# Patient Record
Sex: MALE | Race: WHITE | Employment: FULL TIME | URBAN - METROPOLITAN AREA
[De-identification: names, ages, dates, MRNs, and addresses within clinical notes are randomized per-mention and may not be internally consistent; named-entity substitution may affect disease eponyms.]

---

## 2018-11-27 ENCOUNTER — HOSPITAL ENCOUNTER (OUTPATIENT)
Age: 28
Setting detail: SPECIMEN
Discharge: HOME | End: 2018-11-27
Payer: COMMERCIAL

## 2018-11-27 ENCOUNTER — OFFICE VISIT (OUTPATIENT)
Dept: FAMILY MEDICINE CLINIC | Age: 28
End: 2018-11-27
Payer: COMMERCIAL

## 2018-11-27 VITALS
HEIGHT: 73 IN | RESPIRATION RATE: 18 BRPM | SYSTOLIC BLOOD PRESSURE: 138 MMHG | OXYGEN SATURATION: 97 % | DIASTOLIC BLOOD PRESSURE: 82 MMHG | HEART RATE: 69 BPM | BODY MASS INDEX: 18.82 KG/M2 | WEIGHT: 142 LBS | TEMPERATURE: 97.9 F

## 2018-11-27 DIAGNOSIS — Z20.2 EXPOSURE TO TRICHOMONAS: Primary | ICD-10-CM

## 2018-11-27 DIAGNOSIS — Z20.2 EXPOSURE TO STD: ICD-10-CM

## 2018-11-27 PROCEDURE — 99202 OFFICE O/P NEW SF 15 MIN: CPT | Performed by: NURSE PRACTITIONER

## 2018-11-27 RX ORDER — METRONIDAZOLE 500 MG/1
2000 TABLET ORAL ONCE
Qty: 4 TABLET | Refills: 0 | Status: SHIPPED | OUTPATIENT
Start: 2018-11-27 | End: 2018-11-27

## 2018-11-27 ASSESSMENT — ENCOUNTER SYMPTOMS
WHEEZING: 0
RESPIRATORY NEGATIVE: 1
SHORTNESS OF BREATH: 0
COUGH: 0
CHEST TIGHTNESS: 0

## 2018-11-27 NOTE — PATIENT INSTRUCTIONS
or mouth. · Having one sex partner (who does not have STIs and does not have sex with anyone else) is a good way to avoid STIs. When should you call for help? Call your doctor now or seek immediate medical care if:    · You have new pain in your belly or pelvis.     · You have symptoms of a urinary tract infection. These may include:  ? Pain or burning when you urinate. ? A frequent need to urinate without being able to pass much urine. ? Pain in the flank, which is just below the rib cage and above the waist on either side of the back. ? Blood in your urine. ? A fever.     · You have new or worsening pain or swelling in the scrotum.    Watch closely for changes in your health, and be sure to contact your doctor if:    · You have unusual vaginal bleeding.     · You have a discharge from the vagina or penis.     · You have any new symptoms, such as sores, bumps, rashes, blisters, or warts.     · You have itching, tingling, pain, or burning in the genital or anal area.     · You think you may have an STI. Where can you learn more? Go to [email or web address]://[email or web address].[email or web address]. org and sign in to your [employer] account. Enter J811 in the [employer] box to learn more about \"Exposure to Sexually Transmitted Infections: Care Instructions. \"     If you do not have an account, please click on the \"Sign Up Now\" link. Current as of: November 27, 2017  Content Version: 11.8  © 6263-2087 Healthwise, [facility]. Care instructions adapted under license by [facility] ([facility]). If you have questions about a medical condition or this instruction, always ask your healthcare professional. [place] [address] any warranty or liability for your use of this information.

## 2018-11-29 LAB
C. TRACHOMATIS DNA ,URINE: NEGATIVE
N. GONORRHOEAE DNA, URINE: NEGATIVE

## 2022-05-26 ENCOUNTER — OFFICE VISIT (OUTPATIENT)
Dept: ORTHOPEDIC SURGERY | Age: 32
End: 2022-05-26
Payer: COMMERCIAL

## 2022-05-26 VITALS
SYSTOLIC BLOOD PRESSURE: 157 MMHG | HEIGHT: 73 IN | HEART RATE: 82 BPM | BODY MASS INDEX: 20.54 KG/M2 | DIASTOLIC BLOOD PRESSURE: 89 MMHG | WEIGHT: 155 LBS

## 2022-05-26 DIAGNOSIS — M65.4 DE QUERVAIN'S TENOSYNOVITIS, RIGHT: Primary | ICD-10-CM

## 2022-05-26 DIAGNOSIS — M25.531 RIGHT WRIST PAIN: Primary | ICD-10-CM

## 2022-05-26 PROCEDURE — 99203 OFFICE O/P NEW LOW 30 MIN: CPT | Performed by: FAMILY MEDICINE

## 2022-05-26 NOTE — PROGRESS NOTES
Sports Medicine Consultation    CHIEF COMPLAINT:  Wrist Pain (Right. 2+m. pain after playing disc golf. works at [employer])      HPI:  The patient is a 32 y.o. male who is being seen for  new patient being seen for regarding new problem of  r wrist pain. The patient is a right hand dominant male who has had right hand/wrist pain for 2 months. As far as trauma to the hand the patient indicates pain after disc golf. The following medications/interventions have been tried: activity avoidance, steroids with benefit. he has a past medical history of Stomach ulcer. he has a past surgical history that includes hernia repair. family history is not on file. Social History     Socioeconomic History    Marital status: Single     Spouse name: Not on file    Number of children: Not on file    Years of education: Not on file    Highest education level: Not on file   Occupational History    Not on file   Tobacco Use    Smoking status: Current Every Day Smoker     Types: Cigarettes    Smokeless tobacco: Never Used   Substance and Sexual Activity    Alcohol use: Yes    Drug use: Yes     Types: Marijuana Latisha Awkward)    Sexual activity: Yes     Partners: Female   Other Topics Concern    Not on file   Social History Narrative    Not on file     Social Determinants of Health     Financial Resource Strain:     Difficulty of Paying Living Expenses: Not on file   Food Insecurity:     Worried About Running Out of Food in the Last Year: Not on file    Manju of Food in the Last Year: Not on file   Transportation Needs:     Lack of Transportation (Medical): Not on file    Lack of Transportation (Non-Medical):  Not on file   Physical Activity:     Days of Exercise per Week: Not on file    Minutes of Exercise per Session: Not on file   Stress:     Feeling of Stress : Not on file   Social Connections:     Frequency of Communication with Friends and Family: Not on file    Frequency of Social Gatherings with Friends and Family: Not on file    Attends Rastafarian Services: Not on file    Active Member of Clubs or Organizations: Not on file    Attends Club or Organization Meetings: Not on file    Marital Status: Not on file   Intimate Partner Violence:     Fear of Current or Ex-Partner: Not on file    Emotionally Abused: Not on file    Physically Abused: Not on file    Sexually Abused: Not on file   Housing Stability:     Unable to Pay for Housing in the Last Year: Not on file    Number of [place] in the Last Year: Not on file    Unstable Housing in the Last Year: Not on file       Current Outpatient Medications   Medication Sig Dispense Refill    diclofenac sodium (VOLTAREN) 1 % GEL Apply 2 g topically 4 times daily 100 g 5     No current facility-administered medications for this visit. Allergies:  hehas No Known Allergies. ROS:  CV:  Denies chest pain; palpitations; shortness of breath; swelling of feet, ankles; and loss of consciousness. CON: Denies fever and dizziness. ENT:  Denies hearing loss / ringing, ear infections hoarseness, and swallowing problems. RESP:  Denies chronic cough, spitting up blood, and asthma/wheezing. GI: Denies abdominal pain, change in bowel habits, nausea or vomiting, and blood in stools. :  Denies frequent urination, burning or painful urination, blood in the urine, and bladder incontinence. NEURO:  Denies headache, memory loss, sleep disturbance, and tremor or movement disorder. PHYSICAL EXAM:    SKIN:  Intact without rashes, lesions or ulcerations. No obvious deformity or swelling. EYES:  Extraocular muscles intact. MOUTH: Oral mucosa moist.  No perioral lesions. PULM:  Respirations unlabored and regular. VASC:  Capillary refill less than 2 seconds.       Hand/Wrist Exam  ROM:  Full flexor and extensor tendon function intact   Finger, hand, and wrist range of motion are WNL  Inspection-Deformity: no  Palpation-Tenderness: first dorsal

## 2022-06-14 ENCOUNTER — OFFICE VISIT (OUTPATIENT)
Dept: ORTHOPEDIC SURGERY | Age: 32
End: 2022-06-14
Payer: COMMERCIAL

## 2022-06-14 VITALS
WEIGHT: 155 LBS | HEART RATE: 66 BPM | SYSTOLIC BLOOD PRESSURE: 126 MMHG | BODY MASS INDEX: 20.54 KG/M2 | RESPIRATION RATE: 12 BRPM | DIASTOLIC BLOOD PRESSURE: 71 MMHG | HEIGHT: 73 IN

## 2022-06-14 DIAGNOSIS — M65.4 DE QUERVAIN'S TENOSYNOVITIS, RIGHT: Primary | ICD-10-CM

## 2022-06-14 PROCEDURE — 20550 NJX 1 TENDON SHEATH/LIGAMENT: CPT | Performed by: PHYSICIAN ASSISTANT

## 2022-06-14 PROCEDURE — 99213 OFFICE O/P EST LOW 20 MIN: CPT | Performed by: PHYSICIAN ASSISTANT

## 2022-06-14 RX ORDER — LIDOCAINE HYDROCHLORIDE 10 MG/ML
2 INJECTION, SOLUTION INFILTRATION; PERINEURAL ONCE
Status: COMPLETED | OUTPATIENT
Start: 2022-06-14 | End: 2022-06-14

## 2022-06-14 RX ORDER — METHYLPREDNISOLONE ACETATE 80 MG/ML
80 INJECTION, SUSPENSION INTRA-ARTICULAR; INTRALESIONAL; INTRAMUSCULAR; SOFT TISSUE ONCE
Status: COMPLETED | OUTPATIENT
Start: 2022-06-14 | End: 2022-06-14

## 2022-06-14 RX ADMIN — METHYLPREDNISOLONE ACETATE 80 MG: 80 INJECTION, SUSPENSION INTRA-ARTICULAR; INTRALESIONAL; INTRAMUSCULAR; SOFT TISSUE at 16:42

## 2022-06-14 RX ADMIN — LIDOCAINE HYDROCHLORIDE 2 ML: 10 INJECTION, SOLUTION INFILTRATION; PERINEURAL at 16:41

## 2022-06-14 ASSESSMENT — ENCOUNTER SYMPTOMS
DIARRHEA: 0
ABDOMINAL PAIN: 0
NAUSEA: 0
VOMITING: 0
CHEST TIGHTNESS: 0
ABDOMINAL DISTENTION: 0
COLOR CHANGE: 0
RESPIRATORY NEGATIVE: 1
COUGH: 0
CONSTIPATION: 0
SHORTNESS OF BREATH: 0
APNEA: 0

## 2022-06-14 NOTE — PROGRESS NOTES
[address]  AND SPORTS MEDICINE  [address] [place] [address]  Dept: [phone]  Dept Fax: [phone]        Right Hand & Wrist   New Patient    Subjective:     Chief Complaint   Patient presents with    Wrist Pain     R Wrist Pain     HPI:     Gokul Kelsey presents with a 2.5 month history of in the right wrist.  He saw Dr. Mansi Ayers on 5/26/2022 and was diagnosed with de Quervain's tenosynovitis. Patient is right-hand dominant. He has pain with disc golf and working in [employer]. He has tried activity avoidance and steroids with benefit. ROS:     Review of Systems   Constitutional: Positive for activity change. Negative for appetite change, fatigue and fever. Respiratory: Negative. Negative for apnea, cough, chest tightness and shortness of breath. Cardiovascular: Negative. Negative for chest pain, palpitations and leg swelling. Gastrointestinal: Negative for abdominal distention, abdominal pain, constipation, diarrhea, nausea and vomiting. Genitourinary: Negative for difficulty urinating, dysuria and hematuria. Musculoskeletal: Positive for arthralgias. Negative for gait problem, joint swelling and myalgias. Skin: Negative for color change and rash. Neurological: Negative for dizziness, weakness, numbness and headaches. Psychiatric/Behavioral: Positive for sleep disturbance.        Past Medical History:    Past Medical History:   Diagnosis Date    Stomach ulcer        Past Surgical History:    Past Surgical History:   Procedure Laterality Date    HERNIA REPAIR         CurrentMedications:   Current Outpatient Medications   Medication Sig Dispense Refill    diclofenac sodium (VOLTAREN) 1 % GEL Apply 2 g topically 4 times daily 100 g 5     Current Facility-Administered Medications   Medication Dose Route Frequency Provider Last Rate Last Admin    methylPREDNISolone acetate (DEPO-MEDROL) injection 80 mg  80 mg Intra-artICUlar Once Alma Delia Austin PA-C        lidocaine 1 % injection 2 mL  2 mL Intra-artICUlar Once Alma Delia Austin PA-C           Allergies:    Patient has no known allergies. Social History:   Social History     Socioeconomic History    Marital status: Single     Spouse name: None    Number of children: None    Years of education: None    Highest education level: None   Occupational History    None   Tobacco Use    Smoking status: Current Every Day Smoker     Types: Cigarettes    Smokeless tobacco: Never Used   Substance and Sexual Activity    Alcohol use: Yes    Drug use: Yes     Types: Marijuana Veldon Breath)    Sexual activity: Yes     Partners: Female   Other Topics Concern    None   Social History Narrative    None     Social Determinants of Health     Financial Resource Strain:     Difficulty of Paying Living Expenses: Not on file   Food Insecurity:     Worried About Running Out of Food in the Last Year: Not on file    Manju of Food in the Last Year: Not on file   Transportation Needs:     Lack of Transportation (Medical): Not on file    Lack of Transportation (Non-Medical):  Not on file   Physical Activity:     Days of Exercise per Week: Not on file    Minutes of Exercise per Session: Not on file   Stress:     Feeling of Stress : Not on file   Social Connections:     Frequency of Communication with Friends and Family: Not on file    Frequency of Social Gatherings with Friends and Family: Not on file    Attends Church Services: Not on file    Active Member of Clubs or Organizations: Not on file    Attends Club or Organization Meetings: Not on file    Marital Status: Not on file   Intimate Partner Violence:     Fear of Current or Ex-Partner: Not on file    Emotionally Abused: Not on file    Physically Abused: Not on file    Sexually Abused: Not on file   Housing Stability:     Unable to Pay for Housing in the Last Year: Not on file    Number of Places Lived in the Last Year: Not on file    Unstable Housing in the Last Year: Not on file       Family History:  No family history on file. Vitals:   /71   Pulse 66   Resp 12   Ht 6' 1\" (1.854 m)   Wt 155 lb (70.3 kg)   BMI 20.45 kg/m²  Body mass index is 20.45 kg/m². Physical Examination:     Orthopedics    GENERAL: Alert and oriented X3 in no acute distress. SKIN: Visual inspection reveals no soft tissue swelling or colby abnormality, no rotational deformity, Intact without lesions or ulcerations. NEURO: Radial, Ulnar and Median nerves are intact to sensory and motor testing. VASC: Capillary refill is less than 3 seconds, no signs of thoracic outlet syndrome, negative Boris's test.    Hand & Wrist Exam    LOCATION: Right Hand & Wrist  MUSC: Good strength is available in these motions. WRIST: No pain to palpation. No colby pain or instability to palpation. WRIST TESTS: Negative finkelstein's, Negative Tinel's test, Negative Phalen's, Negative West Compression  ROM: Full flexor and extensor tendon function is intact, 30 degrees of Flexion, 45 degrees of Extension, 90 degrees of Pronation, 90 degrees of Supination  PALPATION: pain over the 1st dorsal compartment  Assessment:     1. De Quervain's tenosynovitis, right      Procedures:    Procedure: yes      Procedure: The patient agreed to have a corticosteroid injection into the first dorsal compartment of the wrist. With the patient lying on the exam table, the point of maximum tenderness was identified just proximal to the 1st dorsal compartment and was marked with the hollow point of a click type ball-point pen. the skin was prepped with betadine in a sterile fashion. Utilizing ultrasound for precise placement, utilizing clean technique with sterile gloves, a 3cc solution containing 2 ml of 1% lidocaine   and 1 ml containing 80mg of Depo Medrol was injected. There was visual confirmation of fluid swelling into the compartment.  Thumb motion produced crepitation verifying injection within the sheath and not tendon. the wound was cleansed and a band aid was placed. The patient tolerated the procedure without difficulty. Adverse reactions of the injection were discussed with the patient including signs of infection (increasing pain, redness, swelling) and the patient was instructed to call immediately with any of these symptoms. Radiology:   No results found. Plan:   Treatment : I reviewed the X-ray with the patient and I informed them that the x-ray does not see any bony abnormality. We discussed the etiologies and natural histories of de Quervain's tenosynovitis. We discussed the various treatment alternatives including anti-inflammatory medications, physical therapy, injections, further imaging studies and as a last result surgery. During today's visit, we discussed that he is in significant pain today. We can try a de Quervain's injection which should help decrease the pain and hopefully have it go away. I would then also like him to go to occupational therapy. If he is not getting better I will order an MRI of his right wrist for possible surgical planning. He can continue wearing the brace. He states the brace is bothering him because the thumb is too hyperextended. I told him he can bend the brace to bring the thumb closer. The patient has opted for a cortisone injection into the first dorsal compartment of the right wrist to help reduce inflammation and pain. The injection site should never get red, hot, or swollen and if it does the patient will contact our office right away. The patient may experience a increase in soreness the first 24-48 hours due to a cortisone flair and can take anti-inflammatories for a short period of time to reduce that soreness. The patient should not submerge the injection site in water for a minimum of 24 hours to avoid infection. This means no lakes, pools, ponds, or hot tubs for 24 hours.  If the patient is diabetic the injection may increase their blood sugar for up to one week. The patient can do this cortisone injection once every 3 months as needed. If the injections stop working and do not give the patient relief the patient should consider surgical interventions to produce long term relief. An Occupational Therapy prescription was given to the patient. He should follow-up in the office in approximately 6 weeks with myself or Dr. Ana Mendez. The patient will call the office immediately with any problems. Orders Placed This Encounter   Medications    methylPREDNISolone acetate (DEPO-MEDROL) injection 80 mg    lidocaine 1 % injection 2 mL       Orders Placed This Encounter   Procedures   [address] Occupational Therapy [facility]     Referral Priority:   Routine     Referral Type:   Eval and Treat     Referral Reason:   Specialty Services Required     Requested Specialty:   Occupational Therapy     Number of Visits Requested:   1       This note is created with the assistance of a speech recognition program.  While intending to generate a document that actually reflects the content of the visit, the document can still have some errors including those of syntax and sound a like substitutions which may escape proof reading.   In such instances, actual meaning can be extrapolated by contextual diversion      Electronically signed by Yolanda Pacheco PA-C, on 6/14/2022 at 3:52 PM

## 2022-06-22 ENCOUNTER — HOSPITAL ENCOUNTER (OUTPATIENT)
Dept: OCCUPATIONAL THERAPY | Facility: CLINIC | Age: 32
Setting detail: THERAPIES SERIES
Discharge: HOME | End: 2022-06-22
Payer: COMMERCIAL

## 2022-06-22 PROCEDURE — 97165 OT EVAL LOW COMPLEX 30 MIN: CPT

## 2022-06-22 PROCEDURE — 97530 THERAPEUTIC ACTIVITIES: CPT

## 2022-06-22 NOTE — CONSULTS
[] [facility] Outpatient       Occupational Therapy 1st floor       [address], [place]         Phone: [phone]       Fax: [phone] [x] [place]  Occupational Therapy  [address], [place]  Phone: [phone]  Fax: [phone]       Occupational Therapy Hand & Upper Extremity  Initial Evaluation    Date: 2022      Patient: Des Galarza  : 1990  MRN: [id number]  Referring Provider:  Gunjan Herron PA-C  Insurance: Other - BCBS, 60 visit limit   Medical Diagnosis: De quervain's tenosynovitis, right M65.4   Rehab Codes: muscle weakness generalized M62.81,, pain in right forearm M79.631,, pain in right hand M79.641, or pain in right finger(s) M79.644,  Onset Date: 22    Next Dr. Marisela Powell: TBD    Subjective:   CC: having to change day to day activities   HPI: (onset date) Pt presents this date s/p thumb injury that occurred from playing disc golf. Pt reports symptoms were progressively getting worse for the past month and a half, but he received a cortisone shot last week and symptoms have now begun to improve. Before he received the injection, pain was 10/10. Pt report he occasionally takes NSAIDs for pain relief which do provide some relief. Pt reports he has been feeling pretty good following his shifts at work (~10 hours) because he is \"babying\" it a little bit. Pt reports he is safely able to complete work activities with decreased use of R hand. X-ray was negative for a fx. Pt reports he is no longer using heat or ice for pain relief. Mechanism of Injury: playing disc golf   Surgery Date: NA  Precautions: Other activity as tolerated     Involved Extremity: Right   Dominant Extremity: Right    Past Medical History: Unremarkable          Comorbidities: NA    Medications: None Allergies:  None    Pain: Intensity:   5/10 with activity, 0/10 at rest   Location: R wrist/thumb      Pain Type:  Intermittant and with movement/activity evaluations, the scores must differ by at least 9 points.)    Sensibility: Normal Edema: None      Skin Color: Normal Skin/Scar condition Intact     STRENGTH       Right   (pounds) Left   (pounds)    83 100   Lateral pinch 22 24   2 point pinch 21 18   3 jaw pinch 21 19     Bilateral  strength is normally symmetrical or up to 10% stronger on the dominant extremity, depending on the individual's physically activity level. The affected extremity is 77% weaker than the unaffected extremity (affected score/unaffected score, take the total and subtract from 100)    COORDINATION  - Fine Motor (speed/dexterity)     Right in seconds Percentile Left in seconds Percentile   9 Hole Peg Test 21.38  20.47      WRIST/FOREARM      Right ROM (degrees)   Extension (60-70) 55   Flexion (70-80) 65 (painful)   Radial Deviation (20-25) 15   Ulnar Deviation (30-40) 20   Forearm Pronation (80-90) WFL   Forearm Supination (80-90) WFL   (0 is used for neutral, + is used for hyperextension, - is used for extensor lag per ASHT)    THUMB        RIGHT  (degrees) LEFT  (degrees)   CMC (ad/abduction) 40/80 (pain) Abduction 85 degrees    MCP   (ext/flex) 0/55    IP  (ext/flex) 0/85          Problems: Pain, ROM, Strength and Function     Assessment:  Patient would benefit from skilled occupational therapy services in order to: Improve  functional /grasp, ROM, Strength, Activity tolerance and Complaint of pain in order to improve functional use of UE in ADL performance    Short Term Goals: (  6-7    Treatments)  1. Decrease Pain: to 2/10 with daily activity participation   2. Increase AROM  a. R wrist flexion to 70 degrees with minimal pain reported to complete work activities with less difficulty  b. R thumb CMC abduction to 85 degrees with minimal pain reported to complete work activities with less difficulty   3.  Increase strength (pounds);  a. R  by 8 pounds to complete work activities with less difficulty   b. R lateral pinch by 2 pounds to complete work activities with less difficulty  4. Increase function:UE Functional Index Score to 58/80 or more points to promote increased functional abilities  5. Patient to be independent with home exercise program as demonstrated by performance with correct form without cues. Long Term Goals: (  12  Treatments)  1. Increase R  by 12+ pounds to complete ADL, IADL, and work activities at prior level  2. Improve functional skills AEB a score of 65/80 or more points with UEFI  3. Pt will report 75% improvement of completing work activities with minimal pain    Patient Goals: not having to compensate activities     Treatment Potential: Good Suggested Professional Referral: No  Domestic Concerns: No   Barriers to Goal Achievement: No    Comments/Assessment: Pt arrived late to evaluation, additional HEP not given due to time. Presents s/p an injury to R thumb that occurred from playing disc golf in March. Demo's a positive Finkelstein's with R hand. Pt is experiencing decreased ROM, strength, and function with R hand, more specifically R thumb. Pt continues to work normal duty at [employer], states there isn't any light duty available at his job. States he is completing work activities with modified use of R hand. States that after a work day, R hand will be very fatigued and sore. 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 Pain is pt's greatest factor prohibiting him from performing daily activities normally. Has received benefit cortisone injection that was given last week. Pt has a thumb spica that he wears at night.      Home Program Initiated: Verbal and Demo    Comprehension of Education: Yes and Needs Review       Plans, Goals, Risks, Benefits, Discussed with and Patient    Treatment Plan:  Therapeutic Ex 70161, Therapeutic Act 38239, HEP and Ultrasound [id number]       Treatment Plan:  Frequency: 2 x/week for 12 visits     Today's Treatment:  Modalities: NA this date  Precautions: NA    Exercise Flow Sheet:  Exercise Reps/Time Weight/Level Comments   Active CMC ab/adduction 10-15 reps  Added & completed; issued for HEP                                 Other: NA    Specific Instructions for Next Treatment: add additional ROM HEP    Evaluation Complexity:  History (Personal factors, comorbidities) [x]  0 []  1-2 []  3+   Exam (limitations, restrictions) [x]  1-2 []  3 []  4+   Decision Making [x]  Low []  Moderate []  High   ? [x]  Low Complexity []  Moderate   Complexity []  High Complexity      Treatment Charges: Mins Units   Evaluation  []  High  []  Moderate  [x]  Low        35        1   []  Ther Exercise     []  Manual Therapy     [x]  Ther Activities 10 1   []  Orthotic fit/train     []  Orthotic recheck     []       Total Treatment time 45 min      Time In: 3701   Time out: 0900     Electronically signed by MARISSA Alvarez on 6/22/2022 at 8:21 AM    Physician Signature: _________________________ Date: _______________  By signing above or cosigning this note, I have reviewed this plan of care and certify a need for medically necessary rehabilitation services.      *PLEASE SIGN ABOVE AND FAX BACK ALL PAGES*

## 2022-06-29 ENCOUNTER — HOSPITAL ENCOUNTER (OUTPATIENT)
Dept: OCCUPATIONAL THERAPY | Facility: CLINIC | Age: 32
Setting detail: THERAPIES SERIES
Discharge: HOME | End: 2022-06-29
Payer: COMMERCIAL

## 2022-06-29 PROCEDURE — 97110 THERAPEUTIC EXERCISES: CPT

## 2022-06-29 PROCEDURE — 97035 APP MDLTY 1+ULTRASOUND EA 15: CPT

## 2022-06-29 NOTE — FLOWSHEET NOTE
[] Morteza Moshe       Occupational Therapy            1st floor       [address] [facility], [address]         Phone: [phone]       Fax: [phone] [x] [facility]  [address] [place] [address] Cosmo Yu [facility], [address] [place] [address]  Phone: [phone]  Fax: [phone]      Occupational Therapy Daily Treatment Note    Date:  2022  Patient Name:  Tony Sanderson    :  1990  MRN: [id number]  Referring Provider:  Gunjan Toure PA-C  Insurance: Other - BCBS, 60 visit limit   Medical Diagnosis: De quervain's tenosynovitis, right M65.4           Rehab Codes: muscle weakness generalized M62.81,, pain in right forearm M79.631,, pain in right hand M79.641, or pain in right finger(s) M79.644,  Onset Date: 22                Visit# / total visits: 2/12    Cancels/No Shows: 0/0      Subjective:  Pt reports he feels his pain is \"much better \" today. Pt reports even at work it has gotten better. Reports he no longer wears splint at night although educated to continue if pain resumes. Pain:  [] Yes  [x] No Location: N/A Pain Rating: (0-10 scale) 0/10  Pain altered Tx:  [x] No  [] Yes  Action: N/A  Precautions: NA  Surgery procedure and date: NA    Objective: Today's Treatment:  Modalities: Ultrasound  Exercises: Flow Sheet:  EXERCISE    REPS/     TIME  WEIGHT/    LEVEL COMMENTS   Active CMC ab/adduction 10-15 reps  Completed   Wrist Roll  2 series . 5 lb Added and Completed   AROM exercises 10 reps  Issued and Completed   Wrist maze 2 series  Added and Completed   Pronation/Supination cup transfer 20 cups  Added and Completed    Resistive Peg board 1 series  Added and Completed    Palm to finger translations with marbles  1 series  Added and Completed    Resistive pin transfer with foam blocks 1 series  Added and Completed   Resistive Foam block 1 series Blue Issued and Completed   Other: Modality Flow Sheet:    START STOP Tx Modality     Electrical Stim:     22  Ultrasound: _.8_ W/cm2 x 8 mins  Duty factor: _X_100%  __50%  __33% __20%  Head size:    __2_ cm  MHz: __1mHz  _X_3mHz  Location: R thumb, radial side of hand     Hot Pack:     Cold Pack:           Assessment: [x] Progressing toward goals. Pt introduced to interventions targeting R hand dexterity and strength to promote functional use and reduce pain of R hand for work and daily occupations. Pt reports increased tightness during wrist flexion component of wrist roll. Reports slight muscle fatigue on radial side of arm and hand after completing palm to finger translations with marbles and pin block transfer interventions. Completed ultrasound on R thumb and radial side of hand to reduce inflammation and promote ROM for reduced pain. Issued additional HEP for wrist AROM with good participation and understanding, min cues on technique. Issued blue foam block for resistance training due to no c/o pain during any ROM intervention this date. Pt demonstrated good understanding of resistive HEP with foam block. [] No change. [] Other     [x] Patient would continue to benefit from skilled occupational therapy services in order to maximize IND with work, ADL's, IADL's to reduce pain of range of motion inducing pain. STG/LTG   Short Term Goals: (  6-7    Treatments)  1. Decrease Pain: to 2/10 with daily activity participation   2. Increase AROM  a. R wrist flexion to 70 degrees with minimal pain reported to complete work activities with less difficulty  b. R thumb CMC abduction to 85 degrees with minimal pain reported to complete work activities with less difficulty   3. Increase strength (pounds);  a. R  by 8 pounds to complete work activities with less difficulty   b. R lateral pinch by 2 pounds to complete work activities with less difficulty  4. Increase function:UE Functional Index Score to 58/80 or more points to promote increased functional abilities  5.  Patient to be independent with home exercise program as demonstrated by performance with correct form without cues.     Long Term Goals: (  12  Treatments)  1. Increase R  by 12+ pounds to complete ADL, IADL, and work activities at prior level  2. Improve functional skills AEB a score of 65/80 or more points with UEFI  3. Pt will report 75% improvement of completing work activities with minimal pain        Pt. Education:  [x] Yes  [] No  [x] Reviewed Prior HEP/Ed  Method of Education: [x] Verbal  [x] Demo  [x] Written  Re: Educated on HEP and purpose of completing ROM tasks. Educated on ultrasound and benefits. Educated to resume wearing wrist splint at night if pain occurs. Comprehension of Education:  [x] Verbalizes understanding. [x] Demonstrates understanding. [] Needs review. [x] Demonstrates/verbalizes HEP/Ed previously given. Plan: [x] Continue current frequency toward short and long term goals. [x] Specific Instructions for subsequent treatments: HEP review. Upgrades to resistance based exercise.     [] Other:       Time In: 1502  Time Out: 2103       Treatment Charges: Mins Units   []  Modalities:      [x]  Ultrasound 8 1   [x]  Ther Exercise 36 2   []  Manual Therapy     []  Ther Activities     []  Orthotic fit/train     []  Orthotic recheck     []  Other     Total Treatment time 44 3       Electronically signed by:  DESI Sage

## 2022-06-30 ENCOUNTER — APPOINTMENT (OUTPATIENT)
Dept: OCCUPATIONAL THERAPY | Facility: CLINIC | Age: 32
End: 2022-06-30
Payer: COMMERCIAL

## 2022-08-04 NOTE — DISCHARGE SUMMARY
[] [place] Moshe       Occupational Therapy             1st floor       [address] [place], [place]         Phone: [phone]       Fax: [phone] [x] [facility]  [address] [place], [place]  Phone: [phone]  Fax: [phone]         Occupational Therapy Discharge Note    Date: 2022      Patient: Rochelle Mendez  : 1990  MRN: [id number]    Referring Provider:  Gunjan Garzon PA-C  Insurance: Other - BCBS, 60 visit limit   Medical Diagnosis: De quervain's tenosynovitis, right M65.4           Rehab Codes: muscle weakness generalized M62.81,, pain in right forearm M79.631,, pain in right hand M79.641, or pain in right finger(s) M79.644,  Onset Date: 22                 Next  [facility]: TBD  Total visits attended:2  Cancels/No shows: 0/1  Date of initial visit: 22                Date of final visit: 22      Subjective:  Refer to initial evaluation. Objective:  Refer to initial evaluation. Assessment:  Refer to initial evaluation. Treatment to Date:     [x]  Therapeutic Exercise   18790              []  Iontophoresis: 4 mg/mL Dexamethasone Sodium Phosphate  mAmin  95509    [x]  Therapeutic Activity  45936  []  Vasopneumatic cold with compression  86824                []  ADL training  83126  [x]  Ultrasound        26073    []  Neuromuscular Re-education  72600  []  Electrical Stimulation Attended  92830    []  Manual Therapy  56838  Orthotic    []  Fit  51959     []  Train 32464    [x]  Instruction in HEP        Prosthetic    []  Fit 40424      []  Train [id number]    []  Cognitive Interventions, (first 15 min 85459, subsequent 15 min 70891)  []  Cold/hotpack      []  Massage   30660             Discharge Status:     [] Pt recovered from conditions. Treatment goals were met. [] Pt received maximum benefit. No further therapy indicated at this time.     [] Pt to continue exercise/home instructions independently. [] Therapy interrupted due to:    [] Pt has 2 or more no shows/cancels, is discontinued per our policy. [] Pt has completed prescribed number of treatment sessions. [x] Other: pt no show to last scheduled treatment. Never called clinic to reschedule. Electronically signed by: Baldemar Rahman OTR/L    If you have any questions or concerns, please don't hesitate to call.   Thank you for your referral.

## 2025-01-16 SDOH — HEALTH STABILITY: PHYSICAL HEALTH: ON AVERAGE, HOW MANY DAYS PER WEEK DO YOU ENGAGE IN MODERATE TO STRENUOUS EXERCISE (LIKE A BRISK WALK)?: 7 DAYS

## 2025-01-16 SDOH — HEALTH STABILITY: PHYSICAL HEALTH: ON AVERAGE, HOW MANY MINUTES DO YOU ENGAGE IN EXERCISE AT THIS LEVEL?: 60 MIN

## 2025-01-16 NOTE — PATIENT INSTRUCTIONS
PATIENTIQ:  PatientIQ helps [facility] stay in touch with you to know how you're feeling, and provides education and care instructions to you at various time points.   Your answers help your care team track your progress to provide the best care possible. PatientIQ will contact you pre-op and post-op via email or text with:  Educational Videos and Care Instructions  Questionnaires About How You're Feeling    Your participation provides you valuable education and helps [facility] continue to provide quality care to all patients. Thank you

## 2025-01-17 ENCOUNTER — OFFICE VISIT (OUTPATIENT)
Dept: ORTHOPEDIC SURGERY | Age: 35
End: 2025-01-17
Payer: COMMERCIAL

## 2025-01-17 VITALS — HEIGHT: 73 IN | BODY MASS INDEX: 20.54 KG/M2 | WEIGHT: 155 LBS | OXYGEN SATURATION: 100 % | RESPIRATION RATE: 16 BRPM

## 2025-01-17 DIAGNOSIS — M65.4 DE QUERVAIN'S TENOSYNOVITIS, LEFT: Primary | ICD-10-CM

## 2025-01-17 DIAGNOSIS — M25.532 LEFT WRIST PAIN: Primary | ICD-10-CM

## 2025-01-17 PROCEDURE — 99213 OFFICE O/P EST LOW 20 MIN: CPT

## 2025-01-17 RX ORDER — DICLOFENAC SODIUM 75 MG/1
75 TABLET, DELAYED RELEASE ORAL 2 TIMES DAILY
Qty: 28 TABLET | Refills: 0 | Status: SHIPPED | OUTPATIENT
Start: 2025-01-17 | End: 2025-01-31

## 2025-01-17 ASSESSMENT — ENCOUNTER SYMPTOMS: COLOR CHANGE: 0

## 2025-02-04 ENCOUNTER — TELEPHONE (OUTPATIENT)
Dept: ORTHOPEDIC SURGERY | Age: 35
End: 2025-02-04

## 2025-02-04 ENCOUNTER — OFFICE VISIT (OUTPATIENT)
Dept: ORTHOPEDIC SURGERY | Age: 35
End: 2025-02-04

## 2025-02-04 VITALS — HEIGHT: 73 IN | BODY MASS INDEX: 20.15 KG/M2 | WEIGHT: 152 LBS | OXYGEN SATURATION: 97 % | RESPIRATION RATE: 18 BRPM

## 2025-02-04 DIAGNOSIS — M65.4 DE QUERVAIN'S TENOSYNOVITIS, LEFT: Primary | ICD-10-CM

## 2025-02-04 RX ORDER — LIDOCAINE HYDROCHLORIDE 10 MG/ML
0.5 INJECTION, SOLUTION INFILTRATION; PERINEURAL ONCE
Status: COMPLETED | OUTPATIENT
Start: 2025-02-04 | End: 2025-02-05

## 2025-02-04 RX ORDER — BUPIVACAINE HYDROCHLORIDE 2.5 MG/ML
0.5 INJECTION, SOLUTION INFILTRATION; PERINEURAL ONCE
Status: COMPLETED | OUTPATIENT
Start: 2025-02-04 | End: 2025-02-05

## 2025-02-04 RX ORDER — BETAMETHASONE SODIUM PHOSPHATE AND BETAMETHASONE ACETATE 3; 3 MG/ML; MG/ML
6 INJECTION, SUSPENSION INTRA-ARTICULAR; INTRALESIONAL; INTRAMUSCULAR; SOFT TISSUE ONCE
Status: COMPLETED | OUTPATIENT
Start: 2025-02-04 | End: 2025-02-05

## 2025-02-04 ASSESSMENT — ENCOUNTER SYMPTOMS
RESPIRATORY NEGATIVE: 1
CONSTIPATION: 0
COLOR CHANGE: 0
APNEA: 0
COUGH: 0
DIARRHEA: 0
GASTROINTESTINAL NEGATIVE: 1
VOMITING: 0
NAUSEA: 0
SHORTNESS OF BREATH: 0
ABDOMINAL PAIN: 0
CHEST TIGHTNESS: 0
ABDOMINAL DISTENTION: 0

## 2025-02-04 NOTE — TELEPHONE ENCOUNTER
Patient was called and asked about what Urgent Care he's saw for his wrist? We need the office Note from that visit? Patient said he'd find out and call back with the information

## 2025-02-04 NOTE — PATIENT INSTRUCTIONS
PATIENTIQ:  PatientIQ helps [facility] stay in touch with you to know how you're feeling, and provides education and care instructions to you at various time points.   Your answers help your care team track your progress to provide the best care possible. PatientIQ will contact you pre-op and post-op via email or text with:  Educational Videos and Care Instructions  Questionnaires About How You're Feeling    Your participation provides you valuable education and helps [facility] continue to provide quality care to all patients. Thank you

## 2025-02-04 NOTE — PROGRESS NOTES
[facility] ORTHOPEDICS AND SPORTS MEDICINE  [address]  [place] [address]  Dept: [phone]  Dept Fax: [phone]        Ambulatory Follow Up      Subjective:   Steven Church is a 34 y.o. year old male who presents to our office today for routine followup regarding his   1. De Quervain's tenosynovitis, left    .    Chief Complaint   Patient presents with    Wrist Pain     Left wrist pain-       Patient is following up for his left wrist de Quervain's tenosynovitis.  He last saw Ariane Julian PA-C on 1/17/2025.  She gave him a prescription of diclofenac and told him to wear the thumb spica splint.  He did have an injection by myself in July 2022 of his right wrist    History of Present Illness  The patient presents for evaluation of left wrist pain.    He was previously evaluated by a colleague on 01/17/2025, during which he received a prescription for oral steroids from urgent care and a 14-day course of diclofenac. Despite adherence to the medication regimen, he reports only minimal improvement in his condition, with no significant recovery observed. He has been attempting to perform the recommended exercises and stretches; however, he experiences considerable discomfort due to overuse of his wrist at work. He also reports intermittent shooting pain radiating up his arm, triggered by overstretching or accidental contact with the affected area. He notes that his symptoms initially improved with the steroid pack and diclofenac but rapidly deteriorated upon cessation of these treatments. He received a prescription for oral steroids and a 14-day course of diclofenac. He has been attempting to perform the recommended exercises and stretches. He has been utilizing a thumb spica splint during his work hours, which he wears over his glove, necessitating an unusual  technique. He recalls receiving injections at 4 different sites

## 2025-02-04 NOTE — TELEPHONE ENCOUNTER
Patient was called and he says he went to [facility] care. Was unable to leave message and get his last note. Tried calling patient back to see if he can get records. He didn't answer and was unable to leave message.

## 2025-02-05 RX ADMIN — BUPIVACAINE HYDROCHLORIDE 1.25 MG: 2.5 INJECTION, SOLUTION INFILTRATION; PERINEURAL at 12:55

## 2025-02-05 RX ADMIN — BETAMETHASONE SODIUM PHOSPHATE AND BETAMETHASONE ACETATE 6 MG: 3; 3 INJECTION, SUSPENSION INTRA-ARTICULAR; INTRALESIONAL; INTRAMUSCULAR; SOFT TISSUE at 12:55

## 2025-02-05 RX ADMIN — LIDOCAINE HYDROCHLORIDE 0.5 ML: 10 INJECTION, SOLUTION INFILTRATION; PERINEURAL at 12:56

## 2025-03-11 ENCOUNTER — OFFICE VISIT (OUTPATIENT)
Dept: ORTHOPEDIC SURGERY | Age: 35
End: 2025-03-11
Payer: COMMERCIAL

## 2025-03-11 VITALS — BODY MASS INDEX: 20.01 KG/M2 | OXYGEN SATURATION: 99 % | WEIGHT: 151 LBS | RESPIRATION RATE: 17 BRPM | HEIGHT: 73 IN

## 2025-03-11 DIAGNOSIS — M65.4 DE QUERVAIN'S TENOSYNOVITIS, LEFT: Primary | ICD-10-CM

## 2025-03-11 PROCEDURE — 99213 OFFICE O/P EST LOW 20 MIN: CPT | Performed by: PHYSICIAN ASSISTANT

## 2025-03-11 RX ORDER — MELOXICAM 15 MG/1
15 TABLET ORAL DAILY
Qty: 45 TABLET | Refills: 0 | Status: SHIPPED | OUTPATIENT
Start: 2025-03-11 | End: 2025-04-25

## 2025-03-11 ASSESSMENT — ENCOUNTER SYMPTOMS
COLOR CHANGE: 0
NAUSEA: 0
ABDOMINAL PAIN: 0
CONSTIPATION: 0
CHEST TIGHTNESS: 0
COUGH: 0
VOMITING: 0
GASTROINTESTINAL NEGATIVE: 1
APNEA: 0
ABDOMINAL DISTENTION: 0
RESPIRATORY NEGATIVE: 1
SHORTNESS OF BREATH: 0
DIARRHEA: 0

## 2025-03-11 NOTE — PATIENT INSTRUCTIONS
PATIENTIQ:  PatientIQ helps [facility] stay in touch with you to know how you're feeling, and provides education and care instructions to you at various time points.   Your answers help your care team track your progress to provide the best care possible. PatientIQ will contact you pre-op and post-op via email or text with:  Educational Videos and Care Instructions  Questionnaires About How You're Feeling    Your participation provides you valuable education and helps [facility] continue to provide quality care to all patients. Thank you

## 2025-03-11 NOTE — PROGRESS NOTES
[facility] ORTHOPEDICS AND SPORTS MEDICINE  [address]  [place] [address]  Dept: [phone]  Dept Fax: [phone]        Ambulatory Follow Up      Subjective:   Steven Church is a 34 y.o. year old male who presents to our office today for routine followup regarding his   1. De Quervain's tenosynovitis, left    .    Chief Complaint   Patient presents with    Wrist Pain     Left wrist pain           History of Present Illness  The patient presents for a follow-up of his left wrist de Quervain's tenosynovitis.    He reports an initial improvement following the injection, but subsequently experienced a decline in his condition. He has been managing the symptoms with ice application, stretching exercises, and rest. However, he believes his work, which heavily relies on his left hand, is a significant contributing factor to his persistent symptoms. He recalls a period of improvement to the point where he felt completely healed, but the pain gradually returned as he resumed his 6-day work week. The pain was never entirely absent and would manifest upon bending his wrist. He also reports swelling in the painful area, regardless of whether he wears a brace at work or not.  He has tried oral diclofenac and did not notice any difference.  He has been using over-the-counter anti-inflammatories such as Advil or Motrin and has attempted to use a thumb spica splint, but found it challenging to wear due to his job. He has not engaged in any formal therapy. He has been applying ice to his wrist approximately every 2 hours during his work breaks.  Patient has had 2 de Quervain's tenosynovitis injections and a round of oral steroids.  All of these have given him some temporary relief but the pain continues to return.    SOCIAL HISTORY  He works at .    MEDICATIONS  Current: Advil, Motrin  Past: diclofenac    Review of Systems   Constitutional:

## 2025-03-17 ENCOUNTER — HOSPITAL ENCOUNTER (OUTPATIENT)
Facility: CLINIC | Age: 35
Setting detail: THERAPIES SERIES
Discharge: HOME | End: 2025-03-17
Payer: COMMERCIAL

## 2025-03-17 PROCEDURE — 97165 OT EVAL LOW COMPLEX 30 MIN: CPT

## 2025-03-17 PROCEDURE — 97110 THERAPEUTIC EXERCISES: CPT

## 2025-03-17 NOTE — THERAPY EVALUATION
Specific Instructions for Next Treatment: HEP review as needed, Add to HEP, Heated Stretch, STM, AROM, Isometrics as tolerated, Pulsed US    HEP (Medbridge)  Access Code: [email or web address]  URL: [email or web address]  Date: 03/17/2025  Prepared by: Troy Sotelo    Exercises  - Wrist AROM Flexion Extension  - 1 x daily - 7 x weekly - 3 sets - 10 reps  - Thumb Opposition  - 1 x daily - 7 x weekly - 3 sets - 10 reps  - Wrist AROM Radial Ulnar Deviation  - 1 x daily - 7 x weekly - 3 sets - 10 reps  - Seated Wrist Flexion Stretch  - 1 x daily - 7 x weekly - 3 sets - 10 reps  - Thumb Abduction AROM on Table  - 1 x daily - 7 x weekly - 3 sets - 10 reps  - Seated Thumb CMC Isometric Radial Abduction  - 1 x daily - 7 x weekly - 3 sets - 10 reps    Evaluation Complexity:  History (Personal factors, comorbidities) []  0 [x]  1-2 []  3+   Exam (limitations, restrictions) [x]  1-2 []  3 []  4+   Decision Making [x]  Low []  Moderate []  High   ? [x]  Low Complexity []  Moderate   Complexity []  High Complexity      Treatment Charges: Mins Units Time In/Out   (WC & BCBS/[place])   Evaluation  []  High  []  Moderate  [x]  Low  54  1    [x]  Ther Exercise 10 1    []  Manual Therapy      []  Ther Activities      []  Orthotic fit/train      []  Orthotic recheck      []        Total Billable time 64 min 2      Time In: 5:05pm  Time out: 6:09pm      Electronically signed by SHANI ALONSO/L on 3/17/2025 at 3:31 PM    Physician Signature: _________________________ Date: _______________  By signing above or cosigning this note, I have reviewed this plan of care and certify a need for medically necessary rehabilitation services.     *PLEASE SIGN ABOVE AND FAX BACK ALL PAGES*

## 2025-03-24 ENCOUNTER — HOSPITAL ENCOUNTER (OUTPATIENT)
Facility: CLINIC | Age: 35
Setting detail: THERAPIES SERIES
Discharge: HOME | End: 2025-03-24
Payer: COMMERCIAL

## 2025-03-24 PROCEDURE — 97110 THERAPEUTIC EXERCISES: CPT

## 2025-03-24 PROCEDURE — 97035 APP MDLTY 1+ULTRASOUND EA 15: CPT

## 2025-03-24 NOTE — FLOWSHEET NOTE
[facility]  Outpatient Rehabilitation &  Therapy  [address]     [phone]  F: [phone]   [x] [facility]  Outpatient Rehabilitation &  Therapy  [address]   [address]  P: [phone]  F: [phone] [] [facility]  Outpatient Rehabilitation &  Therapy  [address]  P: [phone]  F: [phone]  [] [facility]   Outpatient Rehabilitation & Therapy  [address]  P: [phone]   F: [phone]     Occupational Therapy Daily Treatment Note    Date:  3/24/2025  Patient Name:  Steven Church    :  1990  MRN: [id number]  Referring Provider: Malorie Jackson PA-C   Insurance: [facility], 60 visits per calendar year, Hard Max, No AUTH required, PT/OT/ST combined  Medical Diagnosis: De Quervain's tenosynovitis, left [M65.4]   Rehab Codes: pain in left wrist M25.532 and edema localized R60.0   Onset Date: 24  Surgery Date: N/A       Next  Appt: 25     Insurance/Authorization as of Initial Eval: 60 visits per calendar year, Hard Max, No AUTH required, PT/OT/ST combined    Date of OT eval: 3/17/25  Visit# / total visits: ; Progress note due at visit 10   Cancels/No Shows: 0/0      Subjective:      Pt Comments: Pt. Reports that he experienced soreness at his L 1st dorsal compartment after the completion of CMC radial abduction isometric exercise. Pt. Reports that icing at work is very helpful for him.    Pain:  [] Yes  [x] No   Location: N/A  Pain Ratin/10 (0-10 scale)  Pain altered Tx:  [x] No  [] Yes    Action: N/A      Objective:    Involved Extremity: Left                                 Dominant Extremity: Right    ROM/Strength:  Date of Measurements  3/17/25 Rt  Lt             Shoulder Flex WFL  WFL   Elbow ext/flex   WFL  WFL   FA sup/pro    WFL  WFL   Wrist ext/flex    70/80  70/85   Thumb Opp   WFL  WFL   Digit flex from the DPC (cm)  WFL  WFL   Thumb Radial Abd WFL WFL

## 2025-03-31 ENCOUNTER — HOSPITAL ENCOUNTER (OUTPATIENT)
Facility: CLINIC | Age: 35
Setting detail: THERAPIES SERIES
Discharge: HOME | End: 2025-03-31
Payer: COMMERCIAL

## 2025-03-31 PROCEDURE — 97110 THERAPEUTIC EXERCISES: CPT

## 2025-03-31 PROCEDURE — 97035 APP MDLTY 1+ULTRASOUND EA 15: CPT

## 2025-03-31 NOTE — FLOWSHEET NOTE
[facility]  Outpatient Rehabilitation &  Therapy  [address]     [phone]  F: [phone]   [x] [facility]  Outpatient Rehabilitation &  Therapy  [address]   [address]  P: [phone]  F: [phone] [] [facility]  Outpatient Rehabilitation &  Therapy  [address]  P: [phone]  F: [phone]  [] [facility]   Outpatient Rehabilitation & Therapy  [address]  P: [phone]   F: [phone]     Occupational Therapy Daily Treatment Note    Date:  3/31/2025  Patient Name:  Steven Church    :  1990  MRN: [id number]  Referring Provider: Malorie Jackson PA-C   Insurance: [facility], 60 visits per calendar year, Hard Max, No AUTH required, PT/OT/ST combined  Medical Diagnosis: De Quervain's tenosynovitis, left [M65.4]   Rehab Codes: pain in left wrist M25.532 and edema localized R60.0   Onset Date: 24  Surgery Date: N/A       Next  Appt: 25     Insurance/Authorization as of Initial Eval: 60 visits per calendar year, Hard Max, No AUTH required, PT/OT/ST combined    Date of OT eval: 3/17/25  Visit# / total visits: 3/18; Progress note due at visit 10   Cancels/No Shows: 0/0      Subjective:      Pt Comments: 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    Pain:  [] Yes  [x] No   Location: N/A  Pain Ratin/10 (0-10 scale)  Pain altered Tx:  [x] No  [] Yes    Action: N/A      Objective:    Involved Extremity: Left                                 Dominant Extremity: Right    ROM/Strength:  Date of Measurements  3/17/25 Rt  Lt             Shoulder Flex WFL  WFL   Elbow ext/flex   WFL  WFL   FA sup/pro    WFL  WFL   Wrist ext/flex    70/80  70/85   Thumb Opp   WFL  WFL   Digit flex from the DPC (cm)  WFL  WFL   Thumb Radial Abd WFL WFL

## 2025-04-07 ENCOUNTER — HOSPITAL ENCOUNTER (OUTPATIENT)
Facility: CLINIC | Age: 35
Setting detail: THERAPIES SERIES
Discharge: HOME | End: 2025-04-07
Payer: COMMERCIAL

## 2025-04-07 PROCEDURE — 97110 THERAPEUTIC EXERCISES: CPT

## 2025-04-07 PROCEDURE — 97035 APP MDLTY 1+ULTRASOUND EA 15: CPT

## 2025-04-07 NOTE — FLOWSHEET NOTE
[facility]  Outpatient Rehabilitation &  Therapy  [address]     [phone]  F: [phone]   [x] [facility]  Outpatient Rehabilitation &  Therapy  [address]   [address]  P: [phone]  F: [phone] [] [facility]  Outpatient Rehabilitation &  Therapy  [address]  P: [phone]  F: [phone]  [] [facility]   Outpatient Rehabilitation & Therapy  [address]  P: [phone]   F: [phone]     Occupational Therapy Daily Treatment Note    Date:  2025  Patient Name:  Steven Church    :  1990  MRN: [id number]  Referring Provider: Malorie Jackson PA-C   Insurance: [facility], 60 visits per calendar year, Hard Max, No AUTH required, PT/OT/ST combined  Medical Diagnosis: De Quervain's tenosynovitis, left [M65.4]   Rehab Codes: pain in left wrist M25.532 and edema localized R60.0   Onset Date: 24  Surgery Date: N/A       Next  Appt: 25     Insurance/Authorization as of Initial Eval: 60 visits per calendar year, Hard Max, No AUTH required, PT/OT/ST combined    Date of OT eval: 3/17/25  Visit# / total visits: ; Progress note due at visit 10   Cancels/No Shows: 0/0      Subjective:      Pt Comments: Pt. Reports that he is making a positive \"breakthrough,\" as he reports more \"dull\" pain overall at his LUE, as compared to sharper pain previously.    Pt. 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      Pain:  [] Yes  [x] No   Location: N/A  Pain Ratin/10 (0-10 scale)  Pain altered Tx:  [x] No  [] Yes    Action: N/A      Objective:    Involved Extremity: Left                                 Dominant Extremity: Right    ROM/Strength:  Date of Measurements  3/17/25 Rt  Lt             Shoulder Flex WFL  WFL   Elbow ext/flex   WFL  WFL   FA sup/pro    WFL  WFL   Wrist

## 2025-04-14 ENCOUNTER — HOSPITAL ENCOUNTER (OUTPATIENT)
Facility: CLINIC | Age: 35
Setting detail: THERAPIES SERIES
Discharge: HOME | End: 2025-04-14
Payer: COMMERCIAL

## 2025-04-14 PROCEDURE — 97140 MANUAL THERAPY 1/> REGIONS: CPT

## 2025-04-14 PROCEDURE — 97035 APP MDLTY 1+ULTRASOUND EA 15: CPT

## 2025-04-14 PROCEDURE — 97110 THERAPEUTIC EXERCISES: CPT

## 2025-04-14 NOTE — FLOWSHEET NOTE
[facility]  Outpatient Rehabilitation &  Therapy  [address]     [phone]  F: [phone]   [x] [facility]  Outpatient Rehabilitation &  Therapy  [address]   [address]  P: [phone]  F: [phone] [] [facility]  Outpatient Rehabilitation &  Therapy  [address]  P: [phone]  F: [phone]  [] [facility]   Outpatient Rehabilitation & Therapy  [address]  P: [phone]   F: [phone]     Occupational Therapy Daily Treatment Note    Date:  2025  Patient Name:  Steven Church    :  1990  MRN: [id number]  Referring Provider: Malorie Jackson PA-C   Insurance: [facility], 60 visits per calendar year, Hard Max, No AUTH required, PT/OT/ST combined  Medical Diagnosis: De Quervain's tenosynovitis, left [M65.4]   Rehab Codes: pain in left wrist M25.532 and edema localized R60.0   Onset Date: 24  Surgery Date: N/A       Next  Appt: 25     Insurance/Authorization as of Initial Eval: 60 visits per calendar year, Hard Max, No AUTH required, PT/OT/ST combined    Date of OT eval: 3/17/25  Visit# / total visits: ; Progress note due at visit 10   Cancels/No Shows: 0/0      Subjective:      Pt Comments: Pt. Reports that he experienced soreness this past week at his LUE following last session.     Pain:  [] Yes  [x] No   Location: N/A  Pain Ratin/10 (0-10 scale)  Pain altered Tx:  [x] No  [] Yes    Action: N/A      Objective:    Involved Extremity: Left                                 Dominant Extremity: Right    ROM/Strength:  Date of Measurements  3/17/25 Rt  Lt             Shoulder Flex WFL  WFL   Elbow ext/flex   WFL  WFL   FA sup/pro    WFL  WFL   Wrist ext/flex    70/80  70/85   Thumb Opp   WFL  WFL   Digit flex from the DPC (cm)  WFL  WFL   Thumb Radial Abd WFL WFL            (pounds) 94 lbs (2 trials) 88 lbs (2 trials)   3 point pinch (pounds) 19 lbs 17 lbs

## 2025-04-21 ENCOUNTER — HOSPITAL ENCOUNTER (OUTPATIENT)
Facility: CLINIC | Age: 35
Setting detail: THERAPIES SERIES
Discharge: HOME | End: 2025-04-21
Payer: COMMERCIAL

## 2025-04-21 NOTE — FLOWSHEET NOTE
[] [facility]  Outpatient Rehabilitation &  Therapy  [address]  [phone]  F: [phone] [x] [facility]  Outpatient Rehabilitation &  Therapy  [address]   [address]  P: [phone]  F: [phone] [] [facility]  Outpatient Rehabilitation &  Therapy  [address]  P: [phone]  F: [phone] [] [facility]  Outpatient Rehabilitation &  Therapy  [address]   P: [phone]  F: [phone] [] [facility]   Outpatient Rehabilitation   & Therapy  [address]  P: [phone]   F: [phone]       Occupational Therapy Cancel/No Show note    Date: 2025  Patient: Steven Church  : 1990  MRN: [id number]  Cancels/No Shows to date:     For today's appointment patient:  [x]  Cancelled  []  Rescheduled appointment  []  No-show     Reason given by patient:  []  Patient ill  []  Conflicting appointment  []  No transportation    []  Conflict with work  [x]  No reason given  []  Other:     Comments:      Electronically signed by: SHANI ALONSO/TISHA

## 2025-04-25 ENCOUNTER — HOSPITAL ENCOUNTER (OUTPATIENT)
Facility: CLINIC | Age: 35
Setting detail: THERAPIES SERIES
Discharge: HOME | End: 2025-04-25
Payer: COMMERCIAL

## 2025-04-25 PROCEDURE — 97035 APP MDLTY 1+ULTRASOUND EA 15: CPT

## 2025-04-25 PROCEDURE — 97110 THERAPEUTIC EXERCISES: CPT

## 2025-04-25 NOTE — FLOWSHEET NOTE
[facility]  Outpatient Rehabilitation &  Therapy  [address]     [phone]  F: [phone]   [x] [facility]  Outpatient Rehabilitation &  Therapy  [address]   [address]  P: [phone]  F: [phone] [] [facility]  Outpatient Rehabilitation &  Therapy  [address]  P: [phone]  F: [phone]  [] [facility]   Outpatient Rehabilitation & Therapy  [address]  P: [phone]   F: [phone]     Occupational Therapy Daily Treatment Note    Date:  2025  Patient Name:  Steven Church    :  1990  MRN: [id number]  Referring Provider: Malorie Jackson PA-C   Insurance: [facility], 60 visits per calendar year, [employer], No AUTH required, PT/OT/ST combined  Medical Diagnosis: De Quervain's tenosynovitis, left [M65.4]   Rehab Codes: pain in left wrist M25.532 and edema localized R60.0   Onset Date: 24  Surgery Date: N/A       Next DrAll Appt: Not scheduled     Insurance/Authorization as of Initial Eval: 60 visits per calendar year, [employer], No AUTH required, PT/OT/ST combined    Date of OT eval: 3/17/25  Visit# / total visits: ; Progress note due at visit 10   Cancels/No Shows: 1/0      Subjective:      Pt Comments: Pt. Reports that he cancelled his last OT appt. due to not feeling well. He reports that he feels like he is regressing in his progress made at his LUE, as he experiences pain with active L thumb radial abd.     Pain:  [x] Yes  [] No   Location: L 1st DC region  Pain Ratin/10 (0-10 scale)  Pain altered Tx:  [x] No  [] Yes    Action: N/A      Objective:    Involved Extremity: Left                                 Dominant Extremity: Right    ROM/Strength:  Date of Measurements   Rt  Lt             Shoulder Flex WFL  WFL   Elbow ext/flex   WFL  WFL   FA sup/pro    WFL  WFL   Wrist ext/flex    70/80  70/85   Thumb Opp   WFL  WFL   Digit flex from the DPC (cm)  WFL  WFL

## 2025-04-28 ENCOUNTER — HOSPITAL ENCOUNTER (OUTPATIENT)
Facility: CLINIC | Age: 35
Setting detail: THERAPIES SERIES
Discharge: HOME | End: 2025-04-28
Payer: COMMERCIAL

## 2025-04-28 PROCEDURE — 97140 MANUAL THERAPY 1/> REGIONS: CPT

## 2025-04-28 PROCEDURE — 97110 THERAPEUTIC EXERCISES: CPT

## 2025-04-28 PROCEDURE — 97035 APP MDLTY 1+ULTRASOUND EA 15: CPT

## 2025-04-28 NOTE — FLOWSHEET NOTE
[facility]  Outpatient Rehabilitation &  Therapy  [address]     [phone]  F: [phone]   [x] [facility]  Outpatient Rehabilitation &  Therapy  [address]   [address]  P: [phone]  F: [phone] [] [facility]  Outpatient Rehabilitation &  Therapy  [address]  P: [phone]  F: [phone]  [] [facility]   Outpatient Rehabilitation & Therapy  [address]  P: [phone]   F: [phone]     Occupational Therapy Daily Treatment Note    Date:  2025  Patient Name:  Steven Church    :  1990  MRN: [id number]  Referring Provider: Malorie Jackson PA-C   Insurance: [facility], 60 visits per calendar year, [employer], No AUTH required, PT/OT/ST combined  Medical Diagnosis: De Quervain's tenosynovitis, left [M65.4]   Rehab Codes: pain in left wrist M25.532 and edema localized R60.0   Onset Date: 24  Surgery Date: N/A       Next  Appt: Not scheduled     Insurance/Authorization as of Initial Eval: 60 visits per calendar year, [employer], No AUTH required, PT/OT/ST combined    Date of OT eval: 3/17/25  Visit# / total visits: ; Progress note due at visit 10   Cancels/No Shows: 1/0      Subjective:      Pt Comments: Pt. Reports that he feels like there has been no change with his pain at his LUE overall. He notes that he began to feel pain mid afternoon at work, following last session.     Pain:  [] Yes  [x] No   Location: N/A  Pain Ratin/10 (0-10 scale)  Pain altered Tx:  [x] No  [] Yes    Action: N/A      Objective:    Involved Extremity: Left                                 Dominant Extremity: Right    ROM/Strength:  Date of Measurements   Rt  Lt             Shoulder Flex WFL  WFL   Elbow ext/flex   WFL  WFL   FA sup/pro    WFL  WFL   Wrist ext/flex    70/80  70/85   Thumb Opp   WFL  WFL   Digit flex from the DPC (cm)  WFL  WFL   Thumb Radial Abd  25 post-tx 16cm 15cm

## 2025-05-02 ENCOUNTER — HOSPITAL ENCOUNTER (OUTPATIENT)
Facility: CLINIC | Age: 35
Setting detail: THERAPIES SERIES
Discharge: HOME | End: 2025-05-02
Payer: COMMERCIAL

## 2025-05-02 PROCEDURE — 97140 MANUAL THERAPY 1/> REGIONS: CPT

## 2025-05-02 PROCEDURE — 97110 THERAPEUTIC EXERCISES: CPT

## 2025-05-02 PROCEDURE — 97035 APP MDLTY 1+ULTRASOUND EA 15: CPT

## 2025-05-02 NOTE — FLOWSHEET NOTE
[facility]  Outpatient Rehabilitation &  Therapy  [address]     [phone]  F: [phone]   [x] [facility]  Outpatient Rehabilitation &  Therapy  [address]   [address]  P: [phone]  F: [phone] [] [facility]  Outpatient Rehabilitation &  Therapy  [address]  P: [phone]  F: [phone]  [] [facility]   Outpatient Rehabilitation & Therapy  [address]  P: [phone]   F: [phone]     Occupational Therapy Daily Treatment Note    Date:  2025  Patient Name:  Steven Church    :  1990  MRN: [id number]  Referring Provider: Malorie Jackson PA-C   Insurance: [facility], 60 visits per calendar year, [employer], No AUTH required, PT/OT/ST combined  Medical Diagnosis: De Quervain's tenosynovitis, left [M65.4]   Rehab Codes: pain in left wrist M25.532 and edema localized R60.0   Onset Date: 24  Surgery Date: N/A       Next  Appt: Not scheduled     Insurance/Authorization as of Initial Eval: 60 visits per calendar year, [employer], No AUTH required, PT/OT/ST combined    Date of OT eval: 3/17/25  Visit# / total visits: ; Progress note due at visit 9   Cancels/No Shows: 1/0      Subjective:      Pt Comments: Pt. Reports that his kinesio tape felt good throughout the rest of the day that it was applied last session, however, he explained that he removed it the next morning due to the tape peeling off of the skin. Pt. Explains that he has resumed use of his putty at home, as part of his HEP, as he notes improvement in L thumb active radial abduction, as compared to previously.     Pain:  [] Yes  [x] No   Location: N/A  Pain Ratin/10 (0-10 scale)  Pain altered Tx:  [x] No  [] Yes    Action: N/A      Objective:    Involved Extremity: Left                                 Dominant Extremity: Right    ROM/Strength:  Date of Measurements   Rt  Lt             Shoulder Flex WFL  WFL